# Patient Record
Sex: FEMALE | Race: WHITE | Employment: UNEMPLOYED | URBAN - METROPOLITAN AREA
[De-identification: names, ages, dates, MRNs, and addresses within clinical notes are randomized per-mention and may not be internally consistent; named-entity substitution may affect disease eponyms.]

---

## 2019-12-10 ENCOUNTER — HOSPITAL ENCOUNTER (OUTPATIENT)
Age: 34
Discharge: HOME | End: 2019-12-12
Payer: COMMERCIAL

## 2019-12-10 PROCEDURE — 88305 TISSUE EXAM BY PATHOLOGIST: CPT

## 2025-05-27 ENCOUNTER — TELEPHONE (OUTPATIENT)
Age: 40
End: 2025-05-27

## 2025-05-27 NOTE — TELEPHONE ENCOUNTER
RN received a referral on fax from Dr. Fair office for patient. RN contacted to get clarification as referral states breast mass but mammogram from 2024 was negative and his recent office note that was attached doesn't state anything about a lump.  RN contacted Dr.El Graham office and spoke to Ruby. Ruby was unable to give exact reasoning patient needed to be seen also so was going to send message to provider. RN verbalized understanding. Referral placed back in referral bin until clarified why patient needs to be seen. Once clarified if patient needs seen referral needs to be built.            Electronically signed by Shelia Niño RN on 5/27/25 at 11:50 AM EDT

## 2025-05-27 NOTE — TELEPHONE ENCOUNTER
RN received return call from Marielos from referring office. Patient is being referred for B/L breast cyst. Patient had B/L complete US done 05/21/2025 in epic labeled incorrectly as non transvaginal US. RN verbalized understanding and advised office would contact patient now that had clarification on referral.     Ultrasound images unable to be placed on disc.     Electronically signed by Shelia Niño RN on 5/27/25 at 2:03 PM EDT

## 2025-05-28 ENCOUNTER — TELEPHONE (OUTPATIENT)
Age: 40
End: 2025-05-28

## 2025-06-09 ENCOUNTER — TELEPHONE (OUTPATIENT)
Age: 40
End: 2025-06-09

## 2025-06-09 NOTE — TELEPHONE ENCOUNTER
Second attempt to speak with patient regarding referral. left detailed message with call back number.   Pt was referred to office for B/L breast cyst and dense breast by Dr.El Graham. RN left voicemail with office contact information for patient to call back and schedule referral appt. patient was referred to Dr Broussard      NEW PATIENT- B/L breast cyst, dense breast-imaging @ [facility]([place] [facility]-ref by     Electronically signed by Alexa Carlson RN on 6/9/25 at 12:34 PM EDT